# Patient Record
Sex: FEMALE | Race: WHITE | Employment: FULL TIME | URBAN - METROPOLITAN AREA
[De-identification: names, ages, dates, MRNs, and addresses within clinical notes are randomized per-mention and may not be internally consistent; named-entity substitution may affect disease eponyms.]

---

## 2023-04-28 ENCOUNTER — APPOINTMENT (OUTPATIENT)
Dept: CT IMAGING | Age: 43
End: 2023-04-28
Attending: EMERGENCY MEDICINE
Payer: COMMERCIAL

## 2023-04-28 ENCOUNTER — HOSPITAL ENCOUNTER (EMERGENCY)
Age: 43
Discharge: HOME | End: 2023-04-28
Attending: EMERGENCY MEDICINE
Payer: COMMERCIAL

## 2023-04-28 VITALS
OXYGEN SATURATION: 100 % | HEIGHT: 62 IN | WEIGHT: 160 LBS | DIASTOLIC BLOOD PRESSURE: 64 MMHG | SYSTOLIC BLOOD PRESSURE: 100 MMHG | BODY MASS INDEX: 29.44 KG/M2 | TEMPERATURE: 97.9 F | RESPIRATION RATE: 14 BRPM | HEART RATE: 49 BPM

## 2023-04-28 DIAGNOSIS — R20.2 PARESTHESIAS IN RIGHT HAND: ICD-10-CM

## 2023-04-28 DIAGNOSIS — R51.9 ACUTE NONINTRACTABLE HEADACHE, UNSPECIFIED HEADACHE TYPE: Primary | ICD-10-CM

## 2023-04-28 LAB
ALBUMIN SERPL-MCNC: 3.4 G/DL (ref 3.5–5)
ALBUMIN/GLOB SERPL: 0.9 (ref 1.1–2.2)
ALP SERPL-CCNC: 64 U/L (ref 45–117)
ALT SERPL-CCNC: 20 U/L (ref 12–78)
ANION GAP SERPL CALC-SCNC: 4 MMOL/L (ref 5–15)
AST SERPL-CCNC: 20 U/L (ref 15–37)
BASOPHILS # BLD: 0.1 K/UL (ref 0–0.1)
BASOPHILS NFR BLD: 1 % (ref 0–1)
BILIRUB SERPL-MCNC: 0.7 MG/DL (ref 0.2–1)
BUN SERPL-MCNC: 12 MG/DL (ref 6–20)
BUN/CREAT SERPL: 15 (ref 12–20)
CALCIUM SERPL-MCNC: 8.8 MG/DL (ref 8.5–10.1)
CHLORIDE SERPL-SCNC: 107 MMOL/L (ref 97–108)
CO2 SERPL-SCNC: 26 MMOL/L (ref 21–32)
CREAT SERPL-MCNC: 0.8 MG/DL (ref 0.55–1.02)
DIFFERENTIAL METHOD BLD: NORMAL
EOSINOPHIL # BLD: 0.2 K/UL (ref 0–0.4)
EOSINOPHIL NFR BLD: 3 % (ref 0–7)
ERYTHROCYTE [DISTWIDTH] IN BLOOD BY AUTOMATED COUNT: 12.6 % (ref 11.5–14.5)
GLOBULIN SER CALC-MCNC: 3.6 G/DL (ref 2–4)
GLUCOSE SERPL-MCNC: 84 MG/DL (ref 65–100)
HCG SERPL QL: NEGATIVE
HCT VFR BLD AUTO: 39.9 % (ref 35–47)
HGB BLD-MCNC: 13.2 G/DL (ref 11.5–16)
IMM GRANULOCYTES # BLD AUTO: 0 K/UL (ref 0–0.04)
IMM GRANULOCYTES NFR BLD AUTO: 0 % (ref 0–0.5)
LYMPHOCYTES # BLD: 1.6 K/UL (ref 0.8–3.5)
LYMPHOCYTES NFR BLD: 27 % (ref 12–49)
MAGNESIUM SERPL-MCNC: 2.3 MG/DL (ref 1.6–2.4)
MCH RBC QN AUTO: 30.1 PG (ref 26–34)
MCHC RBC AUTO-ENTMCNC: 33.1 G/DL (ref 30–36.5)
MCV RBC AUTO: 90.9 FL (ref 80–99)
MONOCYTES # BLD: 0.3 K/UL (ref 0–1)
MONOCYTES NFR BLD: 6 % (ref 5–13)
NEUTS SEG # BLD: 3.8 K/UL (ref 1.8–8)
NEUTS SEG NFR BLD: 63 % (ref 32–75)
NRBC # BLD: 0 K/UL (ref 0–0.01)
NRBC BLD-RTO: 0 PER 100 WBC
PLATELET # BLD AUTO: 273 K/UL (ref 150–400)
PMV BLD AUTO: 9.5 FL (ref 8.9–12.9)
POTASSIUM SERPL-SCNC: 4.5 MMOL/L (ref 3.5–5.1)
PROT SERPL-MCNC: 7 G/DL (ref 6.4–8.2)
RBC # BLD AUTO: 4.39 M/UL (ref 3.8–5.2)
SODIUM SERPL-SCNC: 137 MMOL/L (ref 136–145)
WBC # BLD AUTO: 6 K/UL (ref 3.6–11)

## 2023-04-28 PROCEDURE — 74011250636 HC RX REV CODE- 250/636

## 2023-04-28 PROCEDURE — 36415 COLL VENOUS BLD VENIPUNCTURE: CPT

## 2023-04-28 PROCEDURE — 80053 COMPREHEN METABOLIC PANEL: CPT

## 2023-04-28 PROCEDURE — 70450 CT HEAD/BRAIN W/O DYE: CPT

## 2023-04-28 PROCEDURE — 99284 EMERGENCY DEPT VISIT MOD MDM: CPT

## 2023-04-28 PROCEDURE — 85025 COMPLETE CBC W/AUTO DIFF WBC: CPT

## 2023-04-28 PROCEDURE — 96374 THER/PROPH/DIAG INJ IV PUSH: CPT

## 2023-04-28 PROCEDURE — 83735 ASSAY OF MAGNESIUM: CPT

## 2023-04-28 PROCEDURE — 84703 CHORIONIC GONADOTROPIN ASSAY: CPT

## 2023-04-28 PROCEDURE — 74011250637 HC RX REV CODE- 250/637

## 2023-04-28 RX ORDER — ACETAMINOPHEN 500 MG
1000 TABLET ORAL ONCE
Status: COMPLETED | OUTPATIENT
Start: 2023-04-28 | End: 2023-04-28

## 2023-04-28 RX ORDER — KETOROLAC TROMETHAMINE 30 MG/ML
30 INJECTION, SOLUTION INTRAMUSCULAR; INTRAVENOUS
Status: COMPLETED | OUTPATIENT
Start: 2023-04-28 | End: 2023-04-28

## 2023-04-28 RX ORDER — BUTALBITAL, ACETAMINOPHEN AND CAFFEINE 50; 325; 40 MG/1; MG/1; MG/1
1 TABLET ORAL
Qty: 24 TABLET | Refills: 0 | Status: SHIPPED | OUTPATIENT
Start: 2023-04-28

## 2023-04-28 RX ADMIN — KETOROLAC TROMETHAMINE 30 MG: 30 INJECTION, SOLUTION INTRAMUSCULAR; INTRAVENOUS at 09:41

## 2023-04-28 RX ADMIN — SODIUM CHLORIDE 1000 ML: 9 INJECTION, SOLUTION INTRAVENOUS at 09:41

## 2023-04-28 RX ADMIN — ACETAMINOPHEN 1000 MG: 500 TABLET ORAL at 09:40

## 2023-04-28 NOTE — DISCHARGE INSTRUCTIONS
Wear the wrist splint during the day but particularly during the night. Follow up with Dr. Arlene Simon if your symptoms do not improve.

## 2023-04-28 NOTE — ED NOTES
The patient presents with headache and right arm numbness. Patient reports 3 days of intermittent lancinating right-sided headaches. Denies other complaints. History of these prior, but states that these are more frequent. Patient reports 1 day of right forearm numbness along the palmar aspect. This primarily involves the first through fourth digits. Reports pain in right elbow, in AC fossa. No known trauma. Is right-hand dominant. No weakness. No swelling of joint. My exam shows 46-year-old female, resting bed, no acute distress. No neck pain. Right upper extremity has 2+ radial ulnar pulses. There is 5 out of 5 strength throughout. Median/radial/ulnar nerve distributions intact with good motor and sensation. Good capillary refill. Positive Tinel's sign and Phalen's test on right. Impression/Plan: 46-year-old female presents to the emergency department with 3 days of intermittent headaches, has had these prior but reports these are more frequent as well as right arm paresthesias that began 1 day ago. Regarding patient's headache, these are very atypical for subarachnoid hemorrhage. Patient appears well, doubt meningitis/cellulitis. Discussed with patient, will check a CT of the head to rule out mass lesion given the increasing frequency although believe this is less likely given normal neurologic exam.    Regarding patient's paresthesias, these only involves the forearm and are isolated to the volar aspect along the radial nerve distribution. There is no decreased sensation. Strength is intact. I suspect this is peripheral process, doubt acute central CVA, radiculopathy or plexopathy given distribution. Consider carpal tunnel given + Phalens/Tinnel test.    On reassessment after Toradol, patient's headache improved. Basic labs unremarkable. CT head without mass lesion.   Using shared decision-making, discussed with patient, could consider obtaining teleneurology consult versus discharge with close follow-up. Patient given the latter. Return pressures provided. I personally saw and examined the patient. I have reviewed and agree with the residents findings, including all diagnostic interpretations, and plans as written. I was present during the key portions of separately billed procedures.   Kayla Rosas MD

## 2023-04-28 NOTE — ED PROVIDER NOTES
[facility] EMERGENCY DEPT  EMERGENCY DEPARTMENT ENCOUNTER       Pt Name: Maggie Arceo  MRN: [id number]  Birthdate 1980  Date of evaluation: 2023  Provider: Nadia Barrett MD   PCP: None  Note Started: 9:40 AM 23     CHIEF COMPLAINT       Chief Complaint   Patient presents with    Numbness     Pt stated that starting 3 days ago she started having right sided headache and jaw pain. Says she was also having tingling on the right side as well        HISTORY OF PRESENT ILLNESS: 1 or more elements      History From: Patient  HPI Limitations : None     Maggie Arceo is a 44 y.o. female who presents ambulatory to the ER for evaluation of a headache and right arm numbness. 3 days ago she started having a headache on the right side of her head. It came on suddenly while at work. She was able to finish the work day and has not missed work for this since it's onset. She gets temporary relief with tylenol/ibuprofen. It occurs intermittently and is sharp and severe. She feels better with the lights dimmed. She's not had nausea or vomiting, visual changes, hearing changes, tinnitus, weakness, fever, or recent fall/injury. She's had similar headaches in the past but this headache is more intense. She also has TMJ on the right which was bothering her 3 days ago. She had a nightguard but her dog ate it. Yesterday she felt like the rings on her right hand were fitting snuggly. This morning she woke up with a pins/needles sensation in the right forearm associated with right elbow pain. No recent fall or injury. No neck or shoulder pain. No history of neck arthritis or injury. She's never had these symptoms before. 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 She denies sore throat, cough, dyspnea, chest/abdominal discomfort, weakness elsewhere, dysuria. She notes that 2 months ago her   of suicide and has had substantially increased stressors since then.       Nursing Notes were all reviewed and agreed with or any disagreements were addressed in the HPI. REVIEW OF SYSTEMS      Review of Systems   Constitutional:  Negative for fever. HENT:  Negative for ear pain, sinus pain and sore throat. Eyes:  Negative for pain. Respiratory:  Negative for cough and shortness of breath. Cardiovascular:  Negative for chest pain. Gastrointestinal:  Negative for abdominal pain and vomiting. Genitourinary:  Negative for flank pain. Musculoskeletal:  Negative for arthralgias and neck pain. Neurological:  Positive for headaches. Negative for syncope and weakness. Paresthesias      Positives and Pertinent negatives as per HPI. PAST HISTORY     Past Medical History:  No past medical history on file. Past Surgical History:  No past surgical history on file. Family History:  No family history on file. Social History: Allergies: Allergies   Allergen Reactions    Sulfa (Sulfonamide Antibiotics) Anaphylaxis       CURRENT MEDICATIONS      Previous Medications    No medications on file       PHYSICAL EXAM      ED Triage Vitals [04/28/23 0854]   ED Encounter Vitals Group      /66      Pulse (Heart Rate) 66      Resp Rate 16      Temp 97.9 °F (36.6 °C)      Temp src       O2 Sat (%) 100 %      Weight 160 lb      Height 5' 2\"        Physical Exam  Constitutional:       General: She is not in acute distress. Appearance: She is not toxic-appearing. HENT:      Head: Normocephalic and atraumatic. Nose: Nose normal. No congestion. Mouth/Throat:      Mouth: Mucous membranes are moist.      Pharynx: Oropharynx is clear. Eyes:      Extraocular Movements: Extraocular movements intact. Conjunctiva/sclera: Conjunctivae normal.      Pupils: Pupils are equal, round, and reactive to light. Cardiovascular:      Rate and Rhythm: Normal rate and regular rhythm. Pulmonary:      Effort: Pulmonary effort is normal. No respiratory distress.       Breath sounds: No stridor. No wheezing. Abdominal:      General: Abdomen is flat. There is no distension. Palpations: Abdomen is soft. Tenderness: There is no abdominal tenderness. There is no guarding. Musculoskeletal:         General: No deformity. Cervical back: Normal range of motion and neck supple. No rigidity. Comments: Right Upper Extremity: painless and full passive ROM of the shoulder (internal/external rotation/abduction/adduction), elbow (flexion/extension), wrist (supination/pronation), and fingers (extension/flexion). Compartments of the upper arm and forearm are soft. Radial pulse is 2+. Normal sensory and motor function of the radial, median, and ulnar nerve distributions assessed in the hand. No abrasions, lacerations, or skin breaks. Paresthesias in the median nerve distribution.  + Tinnel and Phalen. Skin:     General: Skin is warm and dry. Capillary Refill: Capillary refill takes less than 2 seconds. Neurological:      Mental Status: She is alert and oriented to person, place, and time. GCS: GCS eye subscore is 4. GCS verbal subscore is 5. GCS motor subscore is 6. Cranial Nerves: Cranial nerves 2-12 are intact. No cranial nerve deficit, dysarthria or facial asymmetry. Sensory: Sensation is intact. No sensory deficit. Motor: Motor function is intact. No weakness or abnormal muscle tone. Coordination: Coordination is intact. Finger-Nose-Finger Test and Heel to [place] vesta Test normal.      Gait: Gait is intact.    Psychiatric:         Mood and Affect: Mood normal.         Behavior: Behavior normal.          DIAGNOSTIC RESULTS   LABS:     Recent Results (from the past 12 hour(s))   CBC WITH AUTOMATED DIFF    Collection Time: 04/28/23  9:39 AM   Result Value Ref Range    WBC 6.0 3.6 - 11.0 K/uL    RBC 4.39 3.80 - 5.20 M/uL    HGB 13.2 11.5 - 16.0 g/dL    HCT 39.9 35.0 - 47.0 %    MCV 90.9 80.0 - 99.0 FL    MCH 30.1 26.0 - 34.0 PG    MCHC 33.1 30.0 - 36.5 g/dL    RDW 12.6 11.5 - 14.5 %    PLATELET 452 368 - 015 K/uL    MPV 9.5 8.9 - 12.9 FL    NRBC 0.0 0  WBC    ABSOLUTE NRBC 0.00 0.00 - 0.01 K/uL    NEUTROPHILS 63 32 - 75 %    LYMPHOCYTES 27 12 - 49 %    MONOCYTES 6 5 - 13 %    EOSINOPHILS 3 0 - 7 %    BASOPHILS 1 0 - 1 %    IMMATURE GRANULOCYTES 0 0.0 - 0.5 %    ABS. NEUTROPHILS 3.8 1.8 - 8.0 K/UL    ABS. LYMPHOCYTES 1.6 0.8 - 3.5 K/UL    ABS. MONOCYTES 0.3 0.0 - 1.0 K/UL    ABS. EOSINOPHILS 0.2 0.0 - 0.4 K/UL    ABS. BASOPHILS 0.1 0.0 - 0.1 K/UL    ABS. IMM. GRANS. 0.0 0.00 - 0.04 K/UL    DF AUTOMATED     METABOLIC PANEL, COMPREHENSIVE    Collection Time: 04/28/23  9:39 AM   Result Value Ref Range    Sodium 137 136 - 145 mmol/L    Potassium 4.5 3.5 - 5.1 mmol/L    Chloride 107 97 - 108 mmol/L    CO2 26 21 - 32 mmol/L    Anion gap 4 (L) 5 - 15 mmol/L    Glucose 84 65 - 100 mg/dL    BUN 12 6 - 20 MG/DL    Creatinine 0.80 0.55 - 1.02 MG/DL    BUN/Creatinine ratio 15 12 - 20      eGFR >60 >60 ml/min/1.73m2    Calcium 8.8 8.5 - 10.1 MG/DL    Bilirubin, total 0.7 0.2 - 1.0 MG/DL    ALT (SGPT) 20 12 - 78 U/L    AST (SGOT) 20 15 - 37 U/L    Alk. phosphatase 64 45 - 117 U/L    Protein, total 7.0 6.4 - 8.2 g/dL    Albumin 3.4 (L) 3.5 - 5.0 g/dL    Globulin 3.6 2.0 - 4.0 g/dL    A-G Ratio 0.9 (L) 1.1 - 2.2     MAGNESIUM    Collection Time: 04/28/23  9:39 AM   Result Value Ref Range    Magnesium 2.3 1.6 - 2.4 mg/dL   HCG QL SERUM    Collection Time: 04/28/23  9:39 AM   Result Value Ref Range    HCG, Ql. Negative NEG          EKG:      RADIOLOGY:  Non-plain film images such as CT, Ultrasound and MRI are read by the radiologist. Plain radiographic images are visualized and preliminarily interpreted by the ED Provider with the below findings:          Interpretation per the Radiologist below, if available at the time of this note:     CT HEAD WO CONT    Result Date: 4/28/2023  EXAM: CT HEAD WO CONT INDICATION: intermittent headache, r/o mass lesion COMPARISON: None. CONTRAST: None. TECHNIQUE: Unenhanced CT of the head was performed using 5 mm images. Brain and bone windows were generated. Coronal and sagittal reformats. CT dose reduction was achieved through use of a standardized protocol tailored for this examination and automatic exposure control for dose modulation. FINDINGS: The ventricles and sulci are normal in size, shape and configuration. There is no significant white matter disease. There is no intracranial hemorrhage, extra-axial collection, or mass effect. The basilar cisterns are open. No CT evidence of acute infarct. The bone windows demonstrate no abnormalities. The visualized portions of the paranasal sinuses and mastoid air cells are clear. No acute abnormality. PROCEDURES   Unless otherwise noted below, none  Procedures     CRITICAL CARE TIME         SCREENINGS   NIH Stroke Scale  Interval: Baseline  Level of Conciousness (1a): Alert  LOC Questions (1b): Answers both questions correctly  LOC Commands (1c): Performs both tasks correctly  Best Gaze (2): Normal  Visual (3): No visual loss  Facial Palsy (4): Normal symmetrical movement  Motor Arm, Left (5a): No drift  Motor Arm, Right (5b): No drift  Motor Leg, Left (6a): No drift  Motor Leg, Right (6b):  No drift  Limb Ataxia (7): Absent  Sensory (8): (!) Mild to Moderate  Best Language (9): No aphasia  Dysarthria (10): Normal  Extinction and Inattention (11): No abnormality  Total: 1         No data recorded         EMERGENCY DEPARTMENT COURSE and DIFFERENTIAL DIAGNOSIS/MDM   Vitals:    Vitals:    04/28/23 0854   BP: 114/66   Pulse: 66   Resp: 16   Temp: 97.9 °F (36.6 °C)   SpO2: 100%   Weight: 72.6 kg (160 lb)   Height: 5' 2\" (1.575 m)        Patient was given the following medications:  Medications   ketorolac (TORADOL) injection 30 mg (30 mg IntraVENous Given 4/28/23 0924)   acetaminophen (TYLENOL) tablet 1,000 mg (1,000 mg Oral Given 4/28/23 0940)   sodium chloride 0.9 % bolus infusion 1,000 mL (1,000 mL IntraVENous New Bag 4/28/23 0941)       CONSULTS: (Who and What was discussed)  None    Chronic Conditions: Headaches    Social Determinants affecting Dx or Tx: None    Records Reviewed (source and summary of external notes):  None available for review    CC/HPI Summary, DDx, ED Course, and Reassessment: 44year old female with a history of headaches here with 3 days of a right sided unilateral intermittent headache and right forearm paresthesias in the median nerve distrubution beginning this morning. She believes the headache was maximal in intensity at onset however she was able to finish her workday with it. It was associated with right sided TMJ pain and increased stressors. No visual disturbances, neck pain, fever. She has paresthesias in the right forearm which are new and not typical for her headaches but no true numbness and no weakness. Her neurologic examination is normal.  History and physical are most suggestive of a benign primary headache and right sided carpal tunnel syndrome. She is right handed, writes/types at work, and symptoms began after sleeping. She is Tinnel and Phalen positive with reproduction of her symptoms. Her symptoms are not consistent with TIA/CVA as she has a peripheral neuropathy. Although her headache was reportedly maximal intensity at onset I have a very low suspicion for spontaneous subarrachnoid as she's had similar prior headaches and was able to finish her work day after the headache started. Structural CNS lesion is a remote possibility. After a long discussion about the risks and benefits of CT/CTA imaging of the head and neck we agreed on a noncontrast head CT as a gross screening exam for structural lesion. She does not need a CTA for CVA or aneurysm screening and she does not need an LP for subarachnoid rule out. Will also check electrolytes given her paresthesias.   Toradol and IV fluids for pain, will avoid sedating medications as she drove here and her preference is to drive home. ED Course as of 04/28/23 1150   Fri Apr 28, 2023   1108 Head CT normal.  Labs unremarkable. [CS]   3821 Shared results with patient. Her headache has improved but is not entirely gone. Her paresthesias in the right arm have persisted. They are still in the distribution of the median nerve and entirely below the elbow. Consistent with carpal tunnel syndrome. We briefly discussed teleneurology evaluation with anticipated MRI however her exam is so consistent with peripheral neuropathy we both agree to not proceed with this today. Fit with a wrist brace and provided PRN hand surgery follow up. Sent Fioricet to her pharmacy. Discharged with anticipatory guidance and return precautions. [CS]      ED Course User Index  [CS] Mayra Morataya MD       Disposition Considerations (Tests not done, Shared Decision Making, Pt Expectation of Test or Tx.): See above      FINAL IMPRESSION     1. Acute nonintractable headache, unspecified headache type    2. Paresthesias in right hand          DISPOSITION/PLAN   Discharged    Discharge Note: The patient is stable for discharge home. The signs, symptoms, diagnosis, and discharge instructions have been discussed, understanding conveyed, and agreed upon. The patient is to follow up as recommended or return to ER should their symptoms worsen. PATIENT REFERRED TO:  Follow-up Information       Follow up With Specialties Details Why Sweetie Jones MD Hand Surgery Physician Schedule an appointment as soon as possible for a visit  If symptoms worsen [address] [facility]  [address]  [address] [id number] [phone]                DISCHARGE MEDICATIONS:  Current Discharge Medication List        START taking these medications    Details   butalbital-acetaminophen-caffeine (FIORICET, ESGIC) -40 mg per tablet Take 1 Tablet by mouth every six (6) hours as needed for Headache.   Qty: 24 Tablet, Refills: 0  Start date: 4/28/2023               DISCONTINUED MEDICATIONS:  Current Discharge Medication List          I am the Primary Clinician of Record. Ulysses Saleh MD (electronically signed)    (Please note that parts of this dictation were completed with voice recognition software. Quite often unanticipated grammatical, syntax, homophones, and other interpretive errors are inadvertently transcribed by the computer software. Please disregards these errors.  Please excuse any errors that have escaped final proofreading.)

## 2023-05-18 RX ORDER — BUTALBITAL, ACETAMINOPHEN AND CAFFEINE 50; 325; 40 MG/1; MG/1; MG/1
1 TABLET ORAL EVERY 6 HOURS PRN
COMMUNITY
Start: 2023-04-28